# Patient Record
(demographics unavailable — no encounter records)

---

## 2025-04-15 NOTE — REVIEW OF SYSTEMS
[Hearing Loss] : hearing loss [Itching] : itching [Skin Rash] : skin rash [Anxiety] : anxiety [Dizziness] : dizziness [Fever] : no fever [Chills] : no chills [Fatigue] : no fatigue [Night Sweats] : no night sweats [Recent Change In Weight] : ~T no recent weight change [Earache] : no earache [Nosebleeds] : no nosebleeds [Postnasal Drip] : no postnasal drip [Nasal Discharge] : no nasal discharge [Sore Throat] : no sore throat [Hoarseness] : no hoarseness [Chest Pain] : no chest pain [Palpitations] : no palpitations [Cough] : no cough [Dysuria] : no dysuria [Incontinence] : incontinence [Hesitancy] : no hesitancy [Nocturia] : nocturia [Hematuria] : no hematuria [Frequency] : no frequency [Impotence] : impotence [Poor Libido] : poor libido [Joint Pain] : joint pain [Joint Stiffness] : joint stiffness [Muscle Pain] : muscle pain [Muscle Weakness] : muscle weakness [Joint Swelling] : joint swelling [Negative] : Heme/Lymph [FreeTextEntry4] : +chronic deafness  [FreeTextEntry8] : dx prostate cancer [FreeTextEntry9] : neuropathy of feet  [de-identified] : facial and scalp rash dry skin [de-identified] : numbness of feet

## 2025-04-15 NOTE — HEALTH RISK ASSESSMENT
[Fair] :  ~his/her~ mood as fair [1 or 2 (0 pts)] : 1 or 2 (0 points) [No] : In the past 12 months have you used drugs other than those required for medical reasons? No [No falls in past year] : Patient reported no falls in the past year [Little interest or pleasure doing things] : 1) Little interest or pleasure doing things [Feeling down, depressed, or hopeless] : 2) Feeling down, depressed, or hopeless [0] : 2) Feeling down, depressed, or hopeless: Not at all (0) [PHQ-2 Negative - No further assessment needed] : PHQ-2 Negative - No further assessment needed [I have developed a follow-up plan documented below in the note.] : I have developed a follow-up plan documented below in the note. [None] : Patient does not have any barriers to medication adherence [Benzodiazepines] : benzodiazepines [Opioids] : opioids [Never] : Never [NO] : No [Patient declined Low Dose CT Scan] : Patient declined Low Dose CT Scan [No Retinopathy] : No retinopathy [Patient declined mammogram] : Patient declined mammogram [Patient declined PAP Smear] : Patient declined PAP Smear [Patient declined bone density test] : Patient declined bone density test [Patient reported colonoscopy was normal] : Patient reported colonoscopy was normal [HIV test declined] : HIV test declined [Hepatitis C test declined] : Hepatitis C test declined [Language] : difficulty with language [Learning/Retaining New Information] : difficulty learning/retaining new information [Behavioral] : behavioral [With Significant Other] : lives with significant other [With Family] : lives with family [# of Members in Household ___] :  household currently consist of [unfilled] member(s) [College] : College [] :  [# Of Children ___] : has [unfilled] children [Sexually Active] : sexually active [Feels Safe at Home] : Feels safe at home [Fully functional (bathing, dressing, toileting, transferring, walking, feeding)] : Fully functional (bathing, dressing, toileting, transferring, walking, feeding) [Fully functional (using the telephone, shopping, preparing meals, housekeeping, doing laundry, using] : Fully functional and needs no help or supervision to perform IADLs (using the telephone, shopping, preparing meals, housekeeping, doing laundry, using transportation, managing medications and managing finances) [Reports changes in hearing] : Reports changes in hearing [Reports normal functional visual acuity (ie: able to read med bottle)] : Reports normal functional visual acuity [Smoke Detector] : smoke detector [Carbon Monoxide Detector] : carbon monoxide detector [Safety elements used in home] : safety elements used in home [Seat Belt] :  uses seat belt [Sunscreen] : uses sunscreen [With Patient/Caregiver] : , with patient/caregiver [Designated Healthcare Proxy] : Designated healthcare proxy [Name: ___] : Health Care Proxy's Name: [unfilled]  [Relationship: ___] : Relationship: [unfilled] [Aggressive treatment] : aggressive treatment [I will adhere to the patient's wishes.] : I will adhere to the patient's wishes. [Intercurrent hospitalizations] : was admitted to the hospital  [Yes] : Yes [Monthly or less (1 pt)] : Monthly or less (1 point) [Unemployed] : unemployed [Reports changes in dental health] : Reports changes in dental health [FreeTextEntry1] : physical [de-identified] : prostate surgery and radiation [de-identified] : cardio neuro psychiatry gastroenterology  [facility] dr ortho  PT [Audit-CScore] : 1 [de-identified] : walking around [de-identified] : regular [QPU1Epfnr] : 0 [LowDoseCTScan] : 2025 [EyeExamDate] : 2024 [Change in mental status noted] : No change in mental status noted [Behavior] : denies difficulty with behavior [Handling Complex Tasks] : denies difficulty handling complex tasks [Reasoning] : denies difficulty with reasoning [Spatial Ability and Orientation] : denies difficulty with spatial ability and orientation [High Risk Behavior] : no high risk behavior [Reports changes in vision] : Reports no changes in vision [Travel to Developing Areas] : does not  travel to developing areas [TB Exposure] : is not being exposed to tuberculosis [Caregiver Concerns] : does not have caregiver concerns [ColonoscopyDate] : 2/11/21 [ColonoscopyComments] : 5 y [de-identified] : 2 y [de-identified] : glasses [de-identified] : utd check up [AdvancecareDate] : 4/15/25

## 2025-04-15 NOTE — ASSESSMENT
[Vaccines Reviewed] : Immunizations reviewed today. Please see immunization details in the vaccine log within the immunization flowsheet.  [FreeTextEntry1] : cpe  deafness  htn  Basic cardiovascular prevention measures are advised including regular exercise, surveillance medical examination, and prudent portion-controlled low fat diet, rich in a variety of vegetables with minimal added sugars, refined starches, and no artificially hydrogenated oils. Discussion and interpretation of previous tests , external notes( labs, radiology, specialist  , patient verbalized understanding.  follow up with cardiology  -gerd follow up gastroenterology colonoscopy due 2026 -neuropathy follow up   neuro and  - left knee pain  follow up ortho and physical therapy  prostate cancer sp surgery and radiation  [facility]   tdap utd.  follow up annual flu shot  cpe in 1 year. letter for filing late given and letter stating patient is deaf written

## 2025-04-15 NOTE — COUNSELING
[Fall prevention counseling provided] : Fall prevention counseling provided [Adequate lighting] : Adequate lighting [No throw rugs] : No throw rugs [Use proper foot wear] : Use proper foot wear [Other: ____] : [unfilled] [Needs reinforcement, provided] : Patient needs reinforcement on understanding of lifestyle changes and steps needed to achieve self management goal; reinforcement was provided

## 2025-04-15 NOTE — PHYSICAL EXAM
[No Edema] : there was no peripheral edema [Normal] : soft, non-tender, non-distended, no masses palpated, no HSM and normal bowel sounds [No CVA Tenderness] : no CVA  tenderness [No Rash] : no rash [Normal Affect] : the affect was normal [Normal Mood] : the mood was normal [Normal Insight/Judgement] : insight and judgment were intact [de-identified] : deaf [de-identified] : thickening of toe nails noted b/l feet  [de-identified] : +deafness, CN 2-12 INTACT, NORMAL STRENGTH UPPER AND LOWER EXT B/L 5/5, NORMAL RAPID ALTERNATING MOVEMENTS AND FINGER TO NOSE

## 2025-04-15 NOTE — HISTORY OF PRESENT ILLNESS
[FreeTextEntry1] : medicare wellness [de-identified] :  81 yo   presents for cpe-   I had prostate cancer and I see the urology dr Eugene.  I had surgery  and radiation  he had recent blood work  and the psa was good.   He has knee pain. He hasnt been able to go back to work.  He needs a letter for [facility]  filing late so house doesnt foreclose.  I have to go back to the dentist.  i am dizzy could it be from the medicine.  or could it be from nerves. going to  makes me nervous i was in the line and i was worried people were going to steal my phone. i almost went to the er but i went to the cardio and he took me off the lisinopril and the dizziness has been better. I think i am on too much medicine. wehn i was dizzy i took some tylenol to see if it would calm the dizziness and it didnt work.   4/12/24 cpenot working for a few months he had a knee injury he went to ortho and got 4 knee injections and he is doing PT for 2 weeks. My walking is off and it is hard to go upstairs.  Unfortunately i am not working i dont know if my job will be there. i might need to look for another part time jobThe knee is getting better. I use ice. i went to the eye dr and the ENT i had respiratory co's they did a scope up my nose.  and it was ok my eyes water  it is not safe for me to drive when my eyes water Dr oats